# Patient Record
Sex: FEMALE | Race: WHITE | NOT HISPANIC OR LATINO | Employment: STUDENT | URBAN - METROPOLITAN AREA
[De-identification: names, ages, dates, MRNs, and addresses within clinical notes are randomized per-mention and may not be internally consistent; named-entity substitution may affect disease eponyms.]

---
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
## 2021-05-24 ENCOUNTER — RECORDS - HEALTHEAST (OUTPATIENT)
Dept: ADMINISTRATIVE | Facility: CLINIC | Age: 21
End: 2021-05-24

## 2021-05-26 ENCOUNTER — RECORDS - HEALTHEAST (OUTPATIENT)
Dept: ADMINISTRATIVE | Facility: CLINIC | Age: 21
End: 2021-05-26

## 2021-05-29 NOTE — TELEPHONE ENCOUNTER
Pt called in states she rash all over her body.  The rash bumps and it is red.  The size of the spots.  Bigger than pencil   The rash  is on her chest and back.  The rash started 4 days ago.  It is started itching the itching is mild.  No new food, on soap, no new cloth.  No immunization recently.  No fever.  The disposition is to be seen with in 3 days.  Care advice given per protocol.  Patient agrees with care advice given.   Agreed to call back if he has additional symptoms or questions.      Fran Andujar RN, [facility] Triage/Med Refill 5/21/2019 8:55 PM        Reason for Disposition    Rash present > 3 days    Protocols used: RASH OR REDNESS - WIDESPREAD-P-AH

## 2021-05-30 NOTE — TELEPHONE ENCOUNTER
Consent given to speak to brett George, patient unable to speak besides in phrases.     Troubles breathing, Started a couple of hours and is getting worse  -Wheezing, triager listened to breathing, wheezing heard clearly    Nose is running and super stuffed, throat hurts off and on     No fever  No new foods or bug bits - or known allergens  No color changes to face  No history of asthma  Never happened before  Not dizzy or lightheaded    Triaged to a disposition of Go to ED Now, reasons for EMS call given to caller. Caller is agreeable to plan.       Reason for Disposition    Severe wheezing (e.g., wheezing can be heard across the room)    Protocols used: WHEEZING - OTHER THAN ASTHMA-P-    Denae Cuevas RN Triage Nurse Advisor [facility]

## 2022-04-23 ENCOUNTER — HOSPITAL ENCOUNTER (EMERGENCY)
Facility: CLINIC | Age: 22
Discharge: HOME | End: 2022-04-23
Attending: EMERGENCY MEDICINE | Admitting: EMERGENCY MEDICINE

## 2022-04-23 VITALS
HEART RATE: 87 BPM | TEMPERATURE: 98.2 F | RESPIRATION RATE: 20 BRPM | DIASTOLIC BLOOD PRESSURE: 58 MMHG | SYSTOLIC BLOOD PRESSURE: 126 MMHG | OXYGEN SATURATION: 97 % | WEIGHT: 150 LBS

## 2022-04-23 DIAGNOSIS — I95.9 HYPOTENSION, UNSPECIFIED HYPOTENSION TYPE: ICD-10-CM

## 2022-04-23 LAB
ALBUMIN SERPL-MCNC: 4 G/DL (ref 3.5–5)
ALP SERPL-CCNC: 70 U/L (ref 45–120)
ALT SERPL W P-5'-P-CCNC: 15 U/L (ref 0–45)
ANION GAP SERPL CALCULATED.3IONS-SCNC: 10 MMOL/L (ref 5–18)
AST SERPL W P-5'-P-CCNC: 12 U/L (ref 0–40)
BILIRUB SERPL-MCNC: 0.6 MG/DL (ref 0–1)
BUN SERPL-MCNC: 9 MG/DL (ref 8–22)
C REACTIVE PROTEIN LHE: <0.1 MG/DL (ref 0–0.8)
CALCIUM SERPL-MCNC: 9.7 MG/DL (ref 8.5–10.5)
CHLORIDE BLD-SCNC: 104 MMOL/L (ref 98–107)
CO2 SERPL-SCNC: 23 MMOL/L (ref 22–31)
CREAT SERPL-MCNC: 0.58 MG/DL (ref 0.6–1.1)
ERYTHROCYTE [DISTWIDTH] IN BLOOD BY AUTOMATED COUNT: 12.5 % (ref 10–15)
GFR SERPL CREATININE-BSD FRML MDRD: >90 ML/MIN/1.73M2
GLUCOSE BLD-MCNC: 89 MG/DL (ref 70–125)
HCT VFR BLD AUTO: 39 % (ref 35–47)
HGB BLD-MCNC: 13.3 G/DL (ref 11.7–15.7)
MAGNESIUM SERPL-MCNC: 1.6 MG/DL (ref 1.8–2.6)
MCH RBC QN AUTO: 28.9 PG (ref 26.5–33)
MCHC RBC AUTO-ENTMCNC: 34.1 G/DL (ref 31.5–36.5)
MCV RBC AUTO: 85 FL (ref 78–100)
PLATELET # BLD AUTO: 308 10E3/UL (ref 150–450)
POTASSIUM BLD-SCNC: 3.9 MMOL/L (ref 3.5–5)
PROT SERPL-MCNC: 7.1 G/DL (ref 6–8)
RBC # BLD AUTO: 4.6 10E6/UL (ref 3.8–5.2)
SODIUM SERPL-SCNC: 137 MMOL/L (ref 136–145)
WBC # BLD AUTO: 8.8 10E3/UL (ref 4–11)

## 2022-04-23 PROCEDURE — 99283 EMERGENCY DEPT VISIT LOW MDM: CPT | Mod: 25

## 2022-04-23 PROCEDURE — 80053 COMPREHEN METABOLIC PANEL: CPT | Performed by: EMERGENCY MEDICINE

## 2022-04-23 PROCEDURE — 96360 HYDRATION IV INFUSION INIT: CPT

## 2022-04-23 PROCEDURE — 250N000013 HC RX MED GY IP 250 OP 250 PS 637: Performed by: EMERGENCY MEDICINE

## 2022-04-23 PROCEDURE — 86140 C-REACTIVE PROTEIN: CPT | Performed by: EMERGENCY MEDICINE

## 2022-04-23 PROCEDURE — 36415 COLL VENOUS BLD VENIPUNCTURE: CPT | Performed by: EMERGENCY MEDICINE

## 2022-04-23 PROCEDURE — 258N000003 HC RX IP 258 OP 636: Performed by: EMERGENCY MEDICINE

## 2022-04-23 PROCEDURE — 83735 ASSAY OF MAGNESIUM: CPT | Performed by: EMERGENCY MEDICINE

## 2022-04-23 PROCEDURE — 85027 COMPLETE CBC AUTOMATED: CPT | Performed by: EMERGENCY MEDICINE

## 2022-04-23 RX ORDER — SODIUM CHLORIDE 9 MG/ML
INJECTION, SOLUTION INTRAVENOUS CONTINUOUS
Status: DISCONTINUED | OUTPATIENT
Start: 2022-04-23 | End: 2022-04-23 | Stop reason: HOSPADM

## 2022-04-23 RX ORDER — MAGNESIUM OXIDE 400 MG/1
400 TABLET ORAL DAILY
Qty: 10 TABLET | Refills: 0 | Status: SHIPPED | OUTPATIENT
Start: 2022-04-23

## 2022-04-23 RX ORDER — MAGNESIUM OXIDE 400 MG/1
400 TABLET ORAL ONCE
Status: COMPLETED | OUTPATIENT
Start: 2022-04-23 | End: 2022-04-23

## 2022-04-23 RX ADMIN — Medication 400 MG: at 18:34

## 2022-04-23 RX ADMIN — SODIUM CHLORIDE 1000 ML: 9 INJECTION, SOLUTION INTRAVENOUS at 17:16

## 2022-04-23 NOTE — ED PROVIDER NOTES
"EMERGENCY DEPARTMENT ENCOUNTER      NAME: Natasha Pinzon  AGE: 21 year old female  YOB: 2000  MRN: [id number]  EVALUATION DATE & TIME: 4/23/2022  4:28 PM    PCP: No primary care provider on file.    ED PROVIDER: Marcial De La Rosa M.D.      Chief Complaint   Patient presents with     Dizziness         FINAL IMPRESSION:  Dizziness  Orthostatic hypotension  Hypomagnesemia    ED COURSE & MEDICAL DECISION MAKING:    Pertinent Labs & Imaging studies reviewed. (See chart for details)  21 year old female presents to the Emergency Department for evaluation of dizziness and intermittent numbness.  Symptoms started last week when she had an episode of dizziness where she had \"darker vision\".  Reports it lasted for almost an hour.  She had a similar episode x2 yesterday.  With this she would get numbness on one side of her body and then the other.  Initially seen in urgent care with recommendation to follow-up in the emergency room.  Presently asymptomatic.  Her dizziness and visual changes suggest more of orthostasis rather than central neurologic process.  Given her intermittent symptoms and bilateral symptoms this also would suggest metabolic process rather than central neurologic.  Will obtain baseline blood work along with orthostatics.  Exam is unremarkable.. Patient appears non toxic with stable vitals signs. Overall exam is benign.          4:50 PM I met with the patient for the initial interview and physical examination. Discussed plan for treatment and workup in the ED.    5 PM.  Patient's orthostatics positive with heart rate going from approximately 85 to 130 with standing.  Normal saline 1 L bolus ordered.  6:24 PM.  Laboratory evaluation unremarkable other than low magnesium at 1.6.  Not likely to be contributory to her symptoms.  Oral magnesium supplementation ordered.  6:25 PM I re-evaluated the patient.  She reports feeling improved after intravenous fluids.  Her orthostatic hypotension likely relates " "to her symptomatology.  Encouraged to drink sport like drinks over the next few days to maintain adequate hydration.  Magnesium supplements also to be given.  At the conclusion of the encounter I discussed the results of all of the tests and the disposition. The questions were answered and return precautions provided. The patient or family acknowledged understanding and was agreeable with the care plan.       PPE: Provider wore gloves, N95 mask, surgical cap    MEDICATIONS GIVEN IN THE EMERGENCY:  Medications   0.9% sodium chloride BOLUS (1,000 mLs Intravenous New Bag 4/23/22 5937)     Followed by   sodium chloride 0.9% infusion (has no administration in time range)       NEW PRESCRIPTIONS STARTED AT TODAY'S ER VISIT  Current Discharge Medication List      START taking these medications    Details   magnesium oxide (MAG-OX) 400 MG tablet Take 1 tablet (400 mg) by mouth daily  Qty: 10 tablet, Refills: 0                =================================================================    HPI    Patient information was obtained from: Patient    Use of Intrepreter: N/A        Natasha Pinzon is a 21 year old female without a pertient medical history who presents to the ED for evaluation of dizziness.    Patient reports she had an episode of dizziness and numbness one week ago. At that time the dizziness which patient describes and lightheadedness and \"seeing blackness\" lasted for about 20 minutes. The numbness was located to patient's left arm for 20 minute before it transitioned to patient's left jawline. This was not concerning to patient until these episodes occurred two more times tomorrow. One episode tomorrow was 20 minutes in length and the other was over an hour. Patient states she is able to get up and move during these episodes, but just \"unable to see clearly\". She has associated nausea and headache during these episodes. The headache is located to the frontal region. She describes it as a pounding sensation. " Patient mentions that the periods of numbness yesterday were to her arm and mouth again, but on the right side this time. Denies any changes to diet that could have contributed. Patient initially presented to the [facility]ency room for evaluation of symptoms, but was sent to the ED for further evaluation.     Of note, patient's last menstrual cycle was early April and was normal in nature. She does endorse having a heavier period during the middle of the cycle, but lighter at the beginning and end. Patient endorses a weight gain of about 20 pounds in the past couple of months. She is vaccinated against COVID.    Denies fever, chills, vomiting, diarrhea, or additional medical concerns or complaints at this time.       REVIEW OF SYSTEMS   Constitutional:  Denies fever, chills. Endorses recent weight gain.  Respiratory:  Denies productive cough or increased work of breathing  Cardiovascular:  Denies chest pain, palpitations  GI:  Denies abdominal pain, vomiting, or change in bowel or bladder habits. Endorses vomiting.   Musculoskeletal:  Denies any new muscle/joint swelling  Skin:  Denies rash   Neurologic:  Denies focal weakness. Endorses intermittent dizziness, lightheadedness, headache, vision changes, and numbness.   All systems negative except as marked.     PAST MEDICAL HISTORY:  History reviewed. No pertinent past medical history.    PAST SURGICAL HISTORY:  History reviewed. No pertinent surgical history.      CURRENT MEDICATIONS:      Current Facility-Administered Medications:      0.9% sodium chloride BOLUS, 1,000 mL, Intravenous, Once, Last Rate: 1,000 mL/hr at 04/23/22 1716, 1,000 mL at 04/23/22 1716 **FOLLOWED BY** sodium chloride 0.9% infusion, , Intravenous, Continuous, Marcial De La Rosa MD    Current Outpatient Medications:      albuterol (PROAIR HFA;PROVENTIL HFA;VENTOLIN HFA) 90 mcg/actuation inhaler, [ALBUTEROL (PROAIR HFA;PROVENTIL HFA;VENTOLIN HFA) 90 MCG/ACTUATION INHALER] Inhale 2 puffs every 4 (four)  hours as needed for wheezing., Disp: 1 Inhaler, Rfl: 0    ALLERGIES:  No Known Allergies    FAMILY HISTORY:  History reviewed. No pertinent family history.    SOCIAL HISTORY:   Social History     Socioeconomic History     Marital status: Single       VITALS:  Patient Vitals for the past 24 hrs:   BP Temp Temp src Pulse Resp SpO2 Weight   04/23/22 1711 120/66 -- -- (!) 134 -- -- --   04/23/22 1710 129/70 -- -- 97 -- -- --   04/23/22 1709 129/65 -- -- 88 -- -- --   04/23/22 1645 -- -- -- 100 30 98 % --   04/23/22 1630 (!) 137/93 -- -- 107 -- 98 % --   04/23/22 1625 133/86 98.2  F (36.8  C) Oral 107 18 96 % 68 kg (150 lb)        PHYSICAL EXAM    Constitutional:  Awake, alert, in no apparent distress  HENT:  Normocephalic, Atraumatic. Bilateral external ears normal. Oropharynx moist. Nose normal. Neck- Normal range of motion with no guarding, No midline cervical tenderness, Supple, No stridor.   Eyes:  PERRL, EOMI with no signs of entrapment, Conjunctiva normal, No discharge.   Respiratory:  Normal breath sounds, No respiratory distress, No wheezing.    Cardiovascular:  Normal heart rate, Normal rhythm, No appreciable rubs or gallops.   GI:  Soft, No tenderness, No distension, No palpable masses  Musculoskeletal:  No edema. Good range of motion in all major joints. No tenderness to palpation or major deformities noted.  Integument:  Warm, Dry, No erythema, No rash.   Neurologic:  Alert & oriented, Normal motor function, Normal sensory function, No focal deficits noted.   Psychiatric:  Affect normal, Judgment normal, Mood normal.     LAB:  All pertinent labs reviewed and interpreted.  Results for orders placed or performed during the hospital encounter of 04/23/22   Comprehensive metabolic panel     Status: Abnormal   Result Value Ref Range    Sodium 137 136 - 145 mmol/L    Potassium 3.9 3.5 - 5.0 mmol/L    Chloride 104 98 - 107 mmol/L    Carbon Dioxide (CO2) 23 22 - 31 mmol/L    Anion Gap 10 5 - 18 mmol/L    Urea  Nitrogen 9 8 - 22 mg/dL    Creatinine 0.58 (L) 0.60 - 1.10 mg/dL    Calcium 9.7 8.5 - 10.5 mg/dL    Glucose 89 70 - 125 mg/dL    Alkaline Phosphatase 70 45 - 120 U/L    AST 12 0 - 40 U/L    ALT 15 0 - 45 U/L    Protein Total 7.1 6.0 - 8.0 g/dL    Albumin 4.0 3.5 - 5.0 g/dL    Bilirubin Total 0.6 0.0 - 1.0 mg/dL    GFR Estimate >90 >60 mL/min/1.73m2   CBC (+ platelets, no diff)     Status: Normal   Result Value Ref Range    WBC Count 8.8 4.0 - 11.0 10e3/uL    RBC Count 4.60 3.80 - 5.20 10e6/uL    Hemoglobin 13.3 11.7 - 15.7 g/dL    Hematocrit 39.0 35.0 - 47.0 %    MCV 85 78 - 100 fL    MCH 28.9 26.5 - 33.0 pg    MCHC 34.1 31.5 - 36.5 g/dL    RDW 12.5 10.0 - 15.0 %    Platelet Count 308 150 - 450 10e3/uL   Magnesium     Status: Abnormal   Result Value Ref Range    Magnesium 1.6 (L) 1.8 - 2.6 mg/dL   CRP inflammation     Status: Normal   Result Value Ref Range    CRP <0.1 0.0 - 0.8 mg/dL           I, Nieves Knott, am serving as a scribe to document services personally performed by Marcial De La Rosa MD, based on my observation and the provider's statements to me. I, Marcial De La Rosa MD attest that Nieves Knott is acting in a scribe capacity, has observed my performance of the services and has documented them in accordance with my direction.    Marcial De La Rosa M.D.  Emergency Medicine  [facility] EMERGENCY ROOM       Marcial De La Rosa MD  04/23/22 1828       Marcial De La Rosa MD  04/23/22 1811

## 2022-04-23 NOTE — ED NOTES
Pt states for past week has been having dizzy spells where her vision goes black at times and has had numbness to left arm right arm, last night was last time this happened and had numbness to right arm last night and then had numbness also to right mouth are lasts abot 10 mins and resolves. Pt denies numbness now, does have some dizziness. Pt also states she gets headaches with these episodes. PT went to urgent care and was sent here for MRI. Denies chest pain. Pt A&Ox4, speech clear and coherent. Strong and equal bilateral hand grasps, no facial droop.

## 2022-04-23 NOTE — ED TRIAGE NOTES
Patient reports that she was at clinic this afternoon and was told to come here for an  MRI, she has had dizziness intermittently for the past week and some intermittent bilateral hand numbness, as well as her mouth, currently not having any numbness and having some mild dizziness.  Radha Henderson RN.......4/23/2022 4:25 PM

## 2023-02-11 ENCOUNTER — HEALTH MAINTENANCE LETTER (OUTPATIENT)
Age: 23
End: 2023-02-11

## 2024-03-09 ENCOUNTER — HEALTH MAINTENANCE LETTER (OUTPATIENT)
Age: 24
End: 2024-03-09

## 2025-03-16 ENCOUNTER — HEALTH MAINTENANCE LETTER (OUTPATIENT)
Age: 25
End: 2025-03-16